# Patient Record
Sex: MALE | Race: WHITE | NOT HISPANIC OR LATINO | Employment: OTHER | URBAN - METROPOLITAN AREA
[De-identification: names, ages, dates, MRNs, and addresses within clinical notes are randomized per-mention and may not be internally consistent; named-entity substitution may affect disease eponyms.]

---

## 2018-01-01 ENCOUNTER — HOSPITAL ENCOUNTER (EMERGENCY)
Facility: HOSPITAL | Age: 73
End: 2018-12-31
Attending: EMERGENCY MEDICINE
Payer: MEDICARE

## 2018-01-01 VITALS — BODY MASS INDEX: 27.99 KG/M2 | WEIGHT: 189 LBS | HEIGHT: 69 IN

## 2018-01-01 DIAGNOSIS — I46.9 CARDIAC ARREST: Primary | ICD-10-CM

## 2018-01-01 LAB — POCT GLUCOSE: 249 MG/DL (ref 70–110)

## 2018-01-01 PROCEDURE — 96374 THER/PROPH/DIAG INJ IV PUSH: CPT

## 2018-01-01 PROCEDURE — 99291 CRITICAL CARE FIRST HOUR: CPT | Mod: 25

## 2018-01-01 PROCEDURE — 82962 GLUCOSE BLOOD TEST: CPT

## 2018-01-01 PROCEDURE — 25000003 PHARM REV CODE 250: Performed by: EMERGENCY MEDICINE

## 2018-01-01 PROCEDURE — 63600175 PHARM REV CODE 636 W HCPCS: Performed by: EMERGENCY MEDICINE

## 2018-01-01 PROCEDURE — 96375 TX/PRO/DX INJ NEW DRUG ADDON: CPT

## 2018-01-01 RX ORDER — CALCIUM CHLORIDE INJECTION 100 MG/ML
INJECTION, SOLUTION INTRAVENOUS CODE/TRAUMA/SEDATION MEDICATION
Status: COMPLETED | OUTPATIENT
Start: 2018-01-01 | End: 2018-01-01

## 2018-01-01 RX ORDER — EPINEPHRINE 0.1 MG/ML
INJECTION INTRAVENOUS CODE/TRAUMA/SEDATION MEDICATION
Status: COMPLETED | OUTPATIENT
Start: 2018-01-01 | End: 2018-01-01

## 2018-01-01 RX ORDER — SODIUM BICARBONATE 1 MEQ/ML
SYRINGE (ML) INTRAVENOUS CODE/TRAUMA/SEDATION MEDICATION
Status: COMPLETED | OUTPATIENT
Start: 2018-01-01 | End: 2018-01-01

## 2018-01-01 RX ORDER — AMIODARONE HYDROCHLORIDE 150 MG/3ML
INJECTION, SOLUTION INTRAVENOUS CODE/TRAUMA/SEDATION MEDICATION
Status: COMPLETED | OUTPATIENT
Start: 2018-01-01 | End: 2018-01-01

## 2018-01-01 RX ADMIN — CALCIUM CHLORIDE 1 G: 100 INJECTION, SOLUTION INTRAVENOUS; INTRAVENTRICULAR at 08:12

## 2018-01-01 RX ADMIN — SODIUM BICARBONATE 50 MEQ: 84 INJECTION, SOLUTION INTRAVENOUS at 09:12

## 2018-01-01 RX ADMIN — EPINEPHRINE 1 MG: 0.1 INJECTION, SOLUTION ENDOTRACHEAL; INTRACARDIAC; INTRAVENOUS at 08:12

## 2018-01-01 RX ADMIN — EPINEPHRINE 1 MG: 0.1 INJECTION, SOLUTION ENDOTRACHEAL; INTRACARDIAC; INTRAVENOUS at 09:12

## 2018-01-01 RX ADMIN — AMIODARONE HYDROCHLORIDE 300 MG: 50 INJECTION, SOLUTION INTRAVENOUS at 08:12

## 2018-01-01 RX ADMIN — DEXTROSE MONOHYDRATE 25 G: 25 INJECTION, SOLUTION INTRAVENOUS at 08:12

## 2018-12-31 PROCEDURE — 63600175 PHARM REV CODE 636 W HCPCS

## 2018-12-31 PROCEDURE — 25000003 PHARM REV CODE 250

## 2018-12-31 NOTE — ED NOTES
Evonne with the [facility] 's office called and released the body to the [facility] pending  home.

## 2018-12-31 NOTE — ED PROVIDER NOTES
SCRIBE #1 NOTE: I, Triny King, am scribing for, and in the presence of, Mohsen Muniz MD. I have scribed the entire note.         History     Chief Complaint   Patient presents with    Cardiac Arrest     witnessed arrest by wife, CPR started by wife. EMS worked patient for about 20 minutes, pulse for 1 minute then lost pulse again. Total time down 40 minutes reported by EMS       Review of patient's allergies indicates:   Allergen Reactions    Morphine      HEADACHE    Penicillins Rash         History of Present Illness   HPI    12/30/2018, 8:55 PM  History obtained from the EMS  History limited secondary to acuity of condition      History of Present Illness: Eleuterio Mckeon is a 73 y.o. male patient with PMHx of CHF, COPD, CAD, DM, and palpitations who presents to the Emergency Department for cardiac arrest. EMS reports arrest was witnessed by patient's wife. CPR started by patient's wife. EMS reports patient has been down for 40 minutes PTA. Glucose on scene was 130. 8.0 ET tube placed on scene. Epi x6, shock x6, 150mg bicarb, and 350mg amiodarone administered PTA. EMS reports pulse returned for a minute, but then pulse lost again. HPI and ROS limited secondary to acuity of condition.    Arrival mode: EMS     PCP: [facility]        Past Medical History:  Past Medical History:   Diagnosis Date    CHF (congestive heart failure)     COPD (chronic obstructive pulmonary disease)     Coronary artery disease     Depression     Diabetes mellitus     GERD (gastroesophageal reflux disease)     Palpitations     Sleep apnea        Past Surgical History:  Past Surgical History:   Procedure Laterality Date    AORTIC VALVE REPLACEMENT      COLONOSCOPY      CORONARY ARTERY BYPASS GRAFT           Family History:  History reviewed. No pertinent family history.    Social History:  Social History     Tobacco Use    Smoking status: Former Smoker     Packs/day: 2.00   Substance and Sexual Activity     Alcohol use: Yes    Drug use: Unknown    Sexual activity: Unknown        Review of Systems   Review of Systems   Unable to perform ROS: Acuity of condition        Physical Exam     Initial Vitals   BP Pulse Resp Temp SpO2   -- -- -- -- --      MAP       --          Physical Exam  Physical exam is limited due to the patient's condition.   General: Patient is unresponsive to verbal and painful stimuli   Head: Atraumatic   Eyes: Pupils nonreactive   ENT: Airway patent. ETT is in place, positive color change on CO2 detector.  Cardiovascular: Heart sounds are absent. Pulses are absent   Respiratory: No spontaneous respirations. Equal breath sounds with controlled ventilation   Abdominal: No distension   Musculoskeletal: No deformities   Skin: Pale   Neurological: Full neuro exam limited due to patient's condition     ED Course   Critical Care  Date/Time: 12/30/2018 10:11 PM  Performed by: Mohsen Muniz MD  Authorized by: Mohsen Muniz MD   Direct patient critical care time: 10 minutes  Additional history critical care time: 5 minutes  Ordering / reviewing critical care time: 5 minutes  Documentation critical care time: 5 minutes  Consult with family critical care time: 5 minutes  Total critical care time (exclusive of procedural time) : 30 minutes  Critical care time was exclusive of separately billable procedures and treating other patients and teaching time.  Critical care was necessary to treat or prevent imminent or life-threatening deterioration of the following conditions: cardiac failure.  Critical care was time spent personally by me on the following activities: blood draw for specimens, interpretation of cardiac output measurements, evaluation of patient's response to treatment, examination of patient, obtaining history from patient or surrogate, ordering and performing treatments and interventions, ordering and review of laboratory studies, re-evaluation of patient's condition and review of old  "charts.        ED Vital Signs:  Vitals:    18   Weight: 85.7 kg (189 lb)   Height: 5' 9" (1.753 m)       Abnormal Lab Results:  Labs Reviewed   POCT GLUCOSE - Abnormal; Notable for the following components:       Result Value    POCT Glucose 249 (*)     All other components within normal limits        All Lab Results:  Results for orders placed or performed during the hospital encounter of 18   POCT glucose   Result Value Ref Range    POCT Glucose 249 (H) 70 - 110 mg/dL       Imaging Results:  Imaging Results    None                    The Emergency Provider reviewed the vital signs and test results, which are outlined above.     ED Discussion     8:55 PM: CPR in progress on arrival.     8:58 PM: Amiodarone administered.    8:59 PM: Epi administered. 1g calcium administered. 1 amp D50 administered.     9:01 PM: Pulse check, no palpable pulse, PEA. CPR resumed. Medications leaking out of IO. Will attempt another IO.     9:03 PM: Pulse check, no palpable pulse. CPR resumed.    9:04 PM: Glucose 249. Epi administered.     9:05 PM: IO to L tib.    9:06 PM: Pulse check, PEA. CPR resumed.    9:07 PM: Bicarb administered.    9:08 PM: Pulse check, PEA. Time of death called.    10:06 PM: Family in bed 6. Discussed patient's condition with family. Discussed with family that patient is . Family understands and states they would like to see patient at some point.        ED Medication(s):  Medications   amiodarone injection (300 mg Intravenous Given 18)   EPINEPHrine 0.1 mg/mL injection (1 mg Intravenous Given 18)   calcium chloride 100 mg/mL (10 %) injection (1 g Intravenous Given 18)   dextrose 50% injection (25 g Intravenous Given 18)   EPINEPHrine 0.1 mg/mL injection (1 mg Intravenous Given 18)   sodium bicarbonate 8.4 % (1 mEq/mL) injection (50 mEq Intravenous Given 18)                  Medical Decision Making     Medical Decision " Making:   Clinical Tests:   Lab Tests: Ordered and Reviewed             Scribe Attestation:   Scribe #1: I performed the above scribed service and the documentation accurately describes the services I performed. I attest to the accuracy of the note.     Attending:   Physician Attestation Statement for Scribe #1: I, Mohsen Muniz MD, personally performed the services described in this documentation, as scribed by Triny King, in my presence, and it is both accurate and complete.           Clinical Impression       ICD-10-CM ICD-9-CM   1. Cardiac arrest I46.9 427.5       Disposition:   Disposition:          Mohsen Muniz MD  [id number]

## 2018-12-31 NOTE — ED NOTES
Ricky from [facility] called to inform us that he will be approaching family for donation in a few hours.